# Patient Record
Sex: FEMALE | Race: WHITE | NOT HISPANIC OR LATINO | Employment: OTHER | URBAN - METROPOLITAN AREA
[De-identification: names, ages, dates, MRNs, and addresses within clinical notes are randomized per-mention and may not be internally consistent; named-entity substitution may affect disease eponyms.]

---

## 2017-05-30 ENCOUNTER — HOSPITAL ENCOUNTER (EMERGENCY)
Facility: HOSPITAL | Age: 82
Discharge: HOME | End: 2017-05-30
Attending: EMERGENCY MEDICINE | Admitting: EMERGENCY MEDICINE

## 2017-05-30 VITALS
WEIGHT: 168 LBS | SYSTOLIC BLOOD PRESSURE: 128 MMHG | RESPIRATION RATE: 16 BRPM | HEART RATE: 83 BPM | OXYGEN SATURATION: 95 % | BODY MASS INDEX: 28.68 KG/M2 | HEIGHT: 64 IN | DIASTOLIC BLOOD PRESSURE: 75 MMHG

## 2017-05-30 DIAGNOSIS — M54.32 SCIATICA OF LEFT SIDE: Primary | ICD-10-CM

## 2017-05-30 PROCEDURE — 99282 EMERGENCY DEPT VISIT SF MDM: CPT

## 2017-05-30 RX ORDER — LANOLIN ALCOHOL/MO/W.PET/CERES
1000 CREAM (GRAM) TOPICAL DAILY
COMMUNITY

## 2017-05-30 RX ORDER — AMIODARONE HYDROCHLORIDE 200 MG/1
200 TABLET ORAL DAILY
COMMUNITY

## 2017-05-30 RX ORDER — DILTIAZEM HYDROCHLORIDE 120 MG/1
120 CAPSULE, COATED, EXTENDED RELEASE ORAL DAILY
COMMUNITY

## 2017-05-30 RX ORDER — METHYLPREDNISOLONE 4 MG/1
TABLET ORAL
Qty: 21 TABLET | Refills: 0 | Status: SHIPPED | OUTPATIENT
Start: 2017-05-30

## 2017-05-30 RX ORDER — BUMETANIDE 0.5 MG/1
0.5 TABLET ORAL DAILY
COMMUNITY

## 2017-05-30 RX ORDER — MELATONIN
1000 DAILY
COMMUNITY

## 2018-01-24 ENCOUNTER — APPOINTMENT (OUTPATIENT)
Dept: CT IMAGING | Facility: HOSPITAL | Age: 83
End: 2018-01-24
Attending: EMERGENCY MEDICINE

## 2018-01-24 ENCOUNTER — APPOINTMENT (OUTPATIENT)
Dept: GENERAL RADIOLOGY | Facility: HOSPITAL | Age: 83
End: 2018-01-24
Attending: EMERGENCY MEDICINE

## 2018-01-24 ENCOUNTER — APPOINTMENT (OUTPATIENT)
Dept: GENERAL RADIOLOGY | Facility: HOSPITAL | Age: 83
End: 2018-01-24

## 2018-01-24 ENCOUNTER — HOSPITAL ENCOUNTER (EMERGENCY)
Facility: HOSPITAL | Age: 83
Discharge: HOME | End: 2018-01-24
Attending: EMERGENCY MEDICINE | Admitting: EMERGENCY MEDICINE

## 2018-01-24 VITALS
HEART RATE: 85 BPM | WEIGHT: 168 LBS | SYSTOLIC BLOOD PRESSURE: 115 MMHG | OXYGEN SATURATION: 98 % | DIASTOLIC BLOOD PRESSURE: 79 MMHG | TEMPERATURE: 96 F | RESPIRATION RATE: 18 BRPM | BODY MASS INDEX: 28.68 KG/M2 | HEIGHT: 64 IN

## 2018-01-24 DIAGNOSIS — S22.31XA CLOSED FRACTURE OF ONE RIB OF RIGHT SIDE, INITIAL ENCOUNTER: ICD-10-CM

## 2018-01-24 DIAGNOSIS — S42.294A OTHER CLOSED NONDISPLACED FRACTURE OF PROXIMAL END OF RIGHT HUMERUS, INITIAL ENCOUNTER: Primary | ICD-10-CM

## 2018-01-24 DIAGNOSIS — S09.90XA TRAUMATIC INJURY OF HEAD, INITIAL ENCOUNTER: ICD-10-CM

## 2018-01-24 PROCEDURE — 99283 EMERGENCY DEPT VISIT LOW MDM: CPT

## 2018-01-24 PROCEDURE — 71101 X-RAY EXAM UNILAT RIBS/CHEST: CPT

## 2018-01-24 PROCEDURE — 70450 CT HEAD/BRAIN W/O DYE: CPT

## 2018-01-24 PROCEDURE — 73060 X-RAY EXAM OF HUMERUS: CPT

## 2018-01-24 RX ORDER — OXYCODONE HYDROCHLORIDE AND ACETAMINOPHEN 5; 325 MG/1; MG/1
1-2 TABLET ORAL EVERY 6 HOURS PRN
Qty: 20 TABLET | Refills: 0 | Status: SHIPPED | OUTPATIENT
Start: 2018-01-24

## 2018-01-24 RX ORDER — OXYCODONE HYDROCHLORIDE AND ACETAMINOPHEN 5; 325 MG/1; MG/1
2 TABLET ORAL ONCE
Status: COMPLETED | OUTPATIENT
Start: 2018-01-24 | End: 2018-01-24

## 2018-01-24 RX ADMIN — OXYCODONE HYDROCHLORIDE AND ACETAMINOPHEN 2 TABLET: 5; 325 TABLET ORAL at 06:06

## 2018-01-24 NOTE — ED NOTES
Pt was going down the stairs and missed the last couple of steps and fell hititing her face and hurting her right shoulder.  Denies LOC     Aixa Montana RN  01/24/18 0519

## 2018-01-24 NOTE — DISCHARGE INSTRUCTIONS
Chest Contusion, Adult  A chest contusion is a deep bruise on the chest. Bruises happen when an injury causes bleeding under the skin. Signs of bruising include pain, puffiness (swelling), and skin that has changed from its normal color. The bruise may turn blue, purple, or yellow. Minor injuries may give you a painless bruise, but worse bruises may stay painful and swollen for a few weeks.  Follow these instructions at home:  · If directed, put ice on the injured area.  ¨ Put ice in a plastic bag.  ¨ Place a towel between your skin and the bag.  ¨ Leave the ice on for 20 minutes, 2-3 times per day.  · Take over-the-counter and prescription medicines only as told by your doctor.  · If told by your doctor, do deep-breathing exercises.  · Do not lie down flat on your back. Keep your head and chest raised (elevated) when you rest or sleep.  · Do not use any products that contain nicotine or tobacco, such as cigarettes and e-cigarettes. If you need help quitting, ask your doctor.  · Do not lift anything that causes pain.  Contact a doctor if:  · Medicines or treatment do not help your swelling or pain.  · You have more bruising.  · You have more swelling.  · Your pain gets worse  · Your symptoms do not get better in one week.  Get help right away if:  · You suddenly have a lot more pain.  · You have trouble breathing.  · You feel dizzy or weak.  · You pass out (faint).  · You have blood in your pee (urine) or poop (stool).  · You cough up blood or you throw up (vomit) blood.  Summary  · A chest contusion is a deep bruise on the chest. Bruises happen when an injury causes bleeding under the skin.  · Treatment may include resting and putting ice on the injured area.  · Contact a doctor if you have trouble breathing or if your pain does not get better with treatment.  This information is not intended to replace advice given to you by your health care provider. Make sure you discuss any questions you have with your health  care provider.  Document Released: 06/05/2009 Document Revised: 09/14/2017 Document Reviewed: 09/14/2017  [employer] Interactive Patient Education © 2017 [employer]

## 2018-01-24 NOTE — ED PROVIDER NOTES
" EMERGENCY DEPARTMENT ENCOUNTER    CHIEF COMPLAINT  Chief Complaint: Fall  History given by: Patient  History limited by:   Room Number: 04/04  PMD: No Known Provider      HPI:  Pt is a 83 y.o. female who presents complaining of fall on steps today PTA. Pt reports falling down approximately 4 steps and landed on R side. Pt states that she struck the R side of her face, but did not have LOC. She reports pain of the R posterior shoulder and R upper arm. She also reports some bilateral rib pain. Pt is on Xarelto for her hx of Afib.    Duration/Onset/Timing: PTA  Location: R shoulder, R upper arm, ribs  Radiation: none  Quality: \"pain\"  Intensity/Severity: moderate  Associated Symptoms: none  Aggravating or Alleviating Factors: movement  Previous Episodes: none      PAST MEDICAL HISTORY  Active Ambulatory Problems     Diagnosis Date Noted   • No Active Ambulatory Problems     Resolved Ambulatory Problems     Diagnosis Date Noted   • No Resolved Ambulatory Problems     Past Medical History:   Diagnosis Date   • Atrial fibrillation    • Injury of back    • Osteoporosis        PAST SURGICAL HISTORY  Past Surgical History:   Procedure Laterality Date   • HYSTERECTOMY         FAMILY HISTORY  History reviewed. No pertinent family history.    SOCIAL HISTORY  Social History     Social History   • Marital status:      Spouse name: N/A   • Number of children: N/A   • Years of education: N/A     Occupational History   • Not on file.     Social History Main Topics   • Smoking status: Never Smoker   • Smokeless tobacco: Not on file   • Alcohol use No   • Drug use: No   • Sexual activity: Not on file     Other Topics Concern   • Not on file     Social History Narrative       ALLERGIES  Sulfa antibiotics and Latex    REVIEW OF SYSTEMS  Review of Systems   Constitutional: Negative for fever.   Respiratory: Negative for shortness of breath.    Cardiovascular: Negative for chest pain.   Musculoskeletal: Positive for arthralgias (R " posterior shoulder, R upper arm).       PHYSICAL EXAM  ED Triage Vitals   Temp Heart Rate Resp BP SpO2   01/24/18 0520 01/24/18 0520 01/24/18 0520 -- 01/24/18 0520   96 °F (35.6 °C) 78 20  98 %      Temp src Heart Rate Source Patient Position BP Location FiO2 (%)   01/24/18 0520 01/24/18 0520 -- -- --   Oral Monitor          Physical Exam   Constitutional: No distress.   HENT:   Head: Normocephalic and atraumatic.   Mouth/Throat: Oropharynx is clear and moist.   Eyes:   Unremarkable   Cardiovascular: Normal rate and regular rhythm.    Pulmonary/Chest: Breath sounds normal. No respiratory distress. She exhibits tenderness (lower rib).   Abdominal: There is no tenderness.   Musculoskeletal: She exhibits no edema or tenderness.   Neurological: She is alert.   Skin: No rash noted.   Nursing note and vitals reviewed.    RADIOLOGY  CT Head Without Contrast - NAD   XR Humerus Right    Humeral Neck Fx   XR Ribs Right With PA Chest    7th R Fib Vx        I ordered the above noted radiological studies. Interpreted by radiologist. Discussed with radiologist (Dr Banks). Reviewed by me in PACS.       PROCEDURES  Procedures      PROGRESS AND CONSULTS  ED Course   5:43 AM  CT head ordered, XR ribs, shoulder ordered for further evaluation.     6:50 AM  Pain is much improved with oral percocet in ER.  Sats okay on RA.  Discussed with family.  Sling and swathe to be placed.      MEDICAL DECISION MAKING  Results were reviewed/discussed with the patient and they were also made aware of online access. Pt also made aware that some labs, such as cultures, will not be resulted during ER visit and follow up with PMD is necessary.     MDM  Number of Diagnoses or Management Options     Amount and/or Complexity of Data Reviewed  Tests in the radiology section of CPT®: ordered and reviewed           DIAGNOSIS  Final diagnoses:   Traumatic injury of head, initial encounter   Other closed nondisplaced fracture of proximal end of right humerus,  initial encounter   Closed fracture of one rib of right side, initial encounter       DISPOSITION  DISCHARGE    Patient discharged in stable condition.    Reviewed implications of results, diagnosis, meds, responsibility to follow up, warning signs and symptoms of possible worsening, potential complications and reasons to return to ER.  Patient/Family voiced understanding of above instructions.    Discussed plan for discharge, as there is no emergent indication for admission.  Pt/family is agreeable and understands need for follow up and repeat testing.  Pt is aware that discharge does not mean that nothing is wrong but it indicates no emergency is present that requires admission and they must continue care with follow-up as given below or physician of their choice.     FOLLOW-UP  No Known Provider  [place] [id number]    In 3 days  If Not Better         Medication List      New Prescriptions          oxyCODONE-acetaminophen 5-325 MG per tablet   Commonly known as:  ROXICET   Take 1-2 tablets by mouth Every 6 (Six) Hours As Needed for Moderate Pain   .         Stop          amiodarone 200 MG tablet   Commonly known as:  PACERONE       MethylPREDNISolone 4 MG tablet   Commonly known as:  MEDROL (JASMYN)               Latest Documented Vital Signs:  As of 7:12 AM  BP- 111/77 HR- 78 Temp- 96 °F (35.6 °C) (Oral) O2 sat- 98%    --  Documentation assistance provided by akila Palmer for Dr. Vick.  Information recorded by the scribe was done at my direction and has been verified and validated by me.     Michael Palmer  01/24/18 0640       John Vick MD  01/24/18 2800

## 2018-07-22 ENCOUNTER — HOSPITAL ENCOUNTER (EMERGENCY)
Facility: HOSPITAL | Age: 83
End: 2018-07-22